# Patient Record
Sex: FEMALE | Race: WHITE | NOT HISPANIC OR LATINO
[De-identification: names, ages, dates, MRNs, and addresses within clinical notes are randomized per-mention and may not be internally consistent; named-entity substitution may affect disease eponyms.]

---

## 2018-02-08 ENCOUNTER — APPOINTMENT (OUTPATIENT)
Dept: NEUROLOGY | Facility: CLINIC | Age: 78
End: 2018-02-08

## 2018-12-04 ENCOUNTER — APPOINTMENT (OUTPATIENT)
Dept: CARDIOLOGY | Facility: CLINIC | Age: 78
End: 2018-12-04
Payer: MEDICARE

## 2018-12-04 ENCOUNTER — NON-APPOINTMENT (OUTPATIENT)
Age: 78
End: 2018-12-04

## 2018-12-04 VITALS
HEART RATE: 70 BPM | SYSTOLIC BLOOD PRESSURE: 130 MMHG | BODY MASS INDEX: 28.45 KG/M2 | DIASTOLIC BLOOD PRESSURE: 80 MMHG | WEIGHT: 177 LBS | HEIGHT: 66 IN | OXYGEN SATURATION: 96 %

## 2018-12-04 DIAGNOSIS — I10 ESSENTIAL (PRIMARY) HYPERTENSION: ICD-10-CM

## 2018-12-04 DIAGNOSIS — E78.5 HYPERLIPIDEMIA, UNSPECIFIED: ICD-10-CM

## 2018-12-04 DIAGNOSIS — I49.9 CARDIAC ARRHYTHMIA, UNSPECIFIED: ICD-10-CM

## 2018-12-04 DIAGNOSIS — Z87.39 PERSONAL HISTORY OF OTHER DISEASES OF THE MUSCULOSKELETAL SYSTEM AND CONNECTIVE TISSUE: ICD-10-CM

## 2018-12-04 DIAGNOSIS — R94.31 ABNORMAL ELECTROCARDIOGRAM [ECG] [EKG]: ICD-10-CM

## 2018-12-04 PROCEDURE — 93000 ELECTROCARDIOGRAM COMPLETE: CPT

## 2018-12-04 PROCEDURE — 93306 TTE W/DOPPLER COMPLETE: CPT

## 2018-12-04 PROCEDURE — 99204 OFFICE O/P NEW MOD 45 MIN: CPT

## 2018-12-04 RX ORDER — OMEGA-3/DHA/EPA/FISH OIL 910-1400MG
CAPSULE ORAL
Refills: 0 | Status: ACTIVE | COMMUNITY

## 2018-12-04 RX ORDER — EZETIMIBE 10 MG/1
10 TABLET ORAL
Qty: 90 | Refills: 3 | Status: ACTIVE | COMMUNITY
Start: 2018-12-04 | End: 1900-01-01

## 2018-12-04 RX ORDER — VALSARTAN AND HYDROCHLOROTHIAZIDE 160; 25 MG/1; MG/1
160-25 TABLET, FILM COATED ORAL DAILY
Qty: 90 | Refills: 3 | Status: ACTIVE | COMMUNITY

## 2018-12-04 RX ORDER — LEVOTHYROXINE SODIUM 0.03 MG/1
25 TABLET ORAL DAILY
Qty: 90 | Refills: 3 | Status: ACTIVE | COMMUNITY

## 2018-12-04 NOTE — HISTORY OF PRESENT ILLNESS
[FreeTextEntry1] : 77 yo female referred for evaluation of abnormal ecg, reportedly showing AFib.\par Known history of hypertension and hyperlipidemia, she denies any h/o CAD, DM. Denies any chest p[ain, SOB, palpitations otherwise, although does not exercise much.\par Intolerant of all statins, as per patient.\par No sig cardiac family history, non-drinker/smoker.

## 2018-12-04 NOTE — DISCUSSION/SUMMARY
[Non-specific ECG Changes] : abnormal ECG [Stress Test Treadmill] : an exercise treadmill test [Echocardiogram] : an echocardiogram [Isotope Perfusion Sestamibi] : cardiac perfusion imaging with sestamibi [Holter Monitor] : a Holter monitor [None] : There are no changes in medication management [Hyperlipidemia] : hyperlipidemia [Stable] : stable [Lipids Test Panel] : a fasting lipid profile [FreeTextEntry1] : No overt evidence of AFib despite the incorrect ecg reading. Will get Holter to confirm. Patient does however have multiple cardiac risk factors and an abnormal ECG - will get echocardiogram and exercise nuclear stress test to risk stratify further,

## 2018-12-04 NOTE — PHYSICAL EXAM
[General Appearance - Well Developed] : well developed [Normal Appearance] : normal appearance [Well Groomed] : well groomed [General Appearance - Well Nourished] : well nourished [No Deformities] : no deformities [General Appearance - In No Acute Distress] : no acute distress [Normal Conjunctiva] : the conjunctiva exhibited no abnormalities [Eyelids - No Xanthelasma] : the eyelids demonstrated no xanthelasmas [Normal Oral Mucosa] : normal oral mucosa [No Oral Pallor] : no oral pallor [No Oral Cyanosis] : no oral cyanosis [Normal Jugular Venous A Waves Present] : normal jugular venous A waves present [Normal Jugular Venous V Waves Present] : normal jugular venous V waves present [No Jugular Venous Chaudhari A Waves] : no jugular venous chaudhari A waves [Heart Rate And Rhythm] : heart rate and rhythm were normal [Heart Sounds] : normal S1 and S2 [Murmurs] : no murmurs present [Respiration, Rhythm And Depth] : normal respiratory rhythm and effort [Exaggerated Use Of Accessory Muscles For Inspiration] : no accessory muscle use [Auscultation Breath Sounds / Voice Sounds] : lungs were clear to auscultation bilaterally [Abdomen Soft] : soft [Abdomen Tenderness] : non-tender [Abdomen Mass (___ Cm)] : no abdominal mass palpated [Abnormal Walk] : normal gait [Gait - Sufficient For Exercise Testing] : the gait was sufficient for exercise testing [Nail Clubbing] : no clubbing of the fingernails [Cyanosis, Localized] : no localized cyanosis [Petechial Hemorrhages (___cm)] : no petechial hemorrhages [Skin Color & Pigmentation] : normal skin color and pigmentation [] : no rash [No Venous Stasis] : no venous stasis [Skin Lesions] : no skin lesions [No Skin Ulcers] : no skin ulcer [No Xanthoma] : no  xanthoma was observed [Oriented To Time, Place, And Person] : oriented to person, place, and time [Affect] : the affect was normal [Mood] : the mood was normal [No Anxiety] : not feeling anxious

## 2018-12-05 ENCOUNTER — APPOINTMENT (OUTPATIENT)
Dept: CARDIOLOGY | Facility: CLINIC | Age: 78
End: 2018-12-05
Payer: MEDICARE

## 2018-12-05 PROCEDURE — 93225 XTRNL ECG REC<48 HRS REC: CPT

## 2018-12-05 PROCEDURE — 93227 XTRNL ECG REC<48 HR R&I: CPT

## 2018-12-10 PROBLEM — I49.9 CARDIAC ARRHYTHMIA, UNSPECIFIED CARDIAC ARRHYTHMIA TYPE: Status: ACTIVE | Noted: 2018-12-04

## 2018-12-10 PROBLEM — R94.31 ABNORMAL EKG: Status: ACTIVE | Noted: 2018-12-04

## 2018-12-10 PROBLEM — I10 BENIGN HYPERTENSION: Status: ACTIVE | Noted: 2018-12-04

## 2018-12-20 ENCOUNTER — APPOINTMENT (OUTPATIENT)
Dept: CARDIOLOGY | Facility: CLINIC | Age: 78
End: 2018-12-20
Payer: MEDICARE

## 2018-12-20 PROCEDURE — 93015 CV STRESS TEST SUPVJ I&R: CPT

## 2018-12-20 PROCEDURE — 78452 HT MUSCLE IMAGE SPECT MULT: CPT

## 2018-12-20 PROCEDURE — A9500: CPT

## 2021-08-31 ENCOUNTER — EMERGENCY (EMERGENCY)
Facility: HOSPITAL | Age: 81
LOS: 0 days | Discharge: HOME | End: 2021-08-31
Payer: MEDICARE

## 2021-08-31 VITALS
DIASTOLIC BLOOD PRESSURE: 92 MMHG | WEIGHT: 169.98 LBS | SYSTOLIC BLOOD PRESSURE: 158 MMHG | HEART RATE: 76 BPM | HEIGHT: 66 IN | TEMPERATURE: 98 F | OXYGEN SATURATION: 100 % | RESPIRATION RATE: 20 BRPM

## 2021-08-31 VITALS
DIASTOLIC BLOOD PRESSURE: 84 MMHG | RESPIRATION RATE: 16 BRPM | SYSTOLIC BLOOD PRESSURE: 144 MMHG | OXYGEN SATURATION: 98 % | HEART RATE: 80 BPM

## 2021-08-31 DIAGNOSIS — I10 ESSENTIAL (PRIMARY) HYPERTENSION: ICD-10-CM
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
## 2021-08-31 DIAGNOSIS — M25.532 PAIN IN LEFT WRIST: ICD-10-CM

## 2021-08-31 DIAGNOSIS — Z04.3 ENCOUNTER FOR EXAMINATION AND OBSERVATION FOLLOWING OTHER ACCIDENT: ICD-10-CM

## 2021-08-31 DIAGNOSIS — T14.90XA INJURY, UNSPECIFIED, INITIAL ENCOUNTER: ICD-10-CM

## 2021-08-31 DIAGNOSIS — M25.562 PAIN IN LEFT KNEE: ICD-10-CM

## 2021-08-31 DIAGNOSIS — E78.5 HYPERLIPIDEMIA, UNSPECIFIED: ICD-10-CM
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
## 2021-08-31 PROCEDURE — 73110 X-RAY EXAM OF WRIST: CPT | Mod: 26,LT

## 2021-08-31 PROCEDURE — 73562 X-RAY EXAM OF KNEE 3: CPT | Mod: 26,LT

## 2021-08-31 PROCEDURE — 99284 EMERGENCY DEPT VISIT MOD MDM: CPT

## 2021-08-31 PROCEDURE — 73090 X-RAY EXAM OF FOREARM: CPT | Mod: 26,LT,76

## 2021-08-31 NOTE — ED ADULT NURSE REASSESSMENT NOTE - NS ED NURSE REASSESS COMMENT FT1
Cane provided with sling. Discharge instructions provided and verbalizes understanding of medication regimen and follow up care. Educational material provided. Denies any pain at this time.

## 2021-08-31 NOTE — ED PROVIDER NOTE - CLINICAL SUMMARY MEDICAL DECISION MAKING FREE TEXT BOX
81y Female with PMHx HTN, HLD presents to the ER for fall. Mechanical fall at 8am this morning, landing on her left arm. Went to urgent care, diagnosed with left fracture and sent in for orthopedics. Reporting left knee buckling. Denies fever, chills, trauma to area, redness, inability to walk, numbness, tingling. Denies hitting head, LOC, chest pain, shortness of breath or dizziness. Will get xrays, reassess. Dispo pending results.

## 2021-08-31 NOTE — CONSULT NOTE ADULT - SUBJECTIVE AND OBJECTIVE BOX
81y R Hand Dominant. Female patient presents to [place] ED c/o severe L wrist pain s/p mechanical fall. Patient denies head hit or LOC. Localizing pain to distal radius. Denies radiation of pain. Pt denies numbness, tingling or burning. Patient denies any other injuries.    PMH:  HTN (hypertension)    HLD (hyperlipidemia)      PSH:    AH:  penicillin (Rash)    Meds: See med rec    T(C): 36.4 (08-31-21 @ 13:21)  HR: 76 (08-31-21 @ 13:21)  BP: 158/92 (08-31-21 @ 13:21)  RR: 20 (08-31-21 @ 13:21)  SpO2: 100% (08-31-21 @ 13:21)  Wt(kg): --    PE :  Gen: NAD, A/Ox3, Following commands    L UE:  Skin intact,  visible deformity of wrist, + soft tissue swelling, no ecchymosis  Decreased ROM of Wrist 2/2 pain  Normal Elbow/Shoulder ROM w/o pain  + TTP over distal radius  No Snuff box TTP  + Rad Pulse   SILT C5-T1  +AIN/PIN/Ulnar/Radial/Musc/Median  compartments soft and compressible    Secondary Survey:   No TTP over bony prominences, SILT, palpable pulses, full/painless A/PROM, compartments soft. No TTP over spinous processes or paraspinal muscles at C/T/L spine. No palpable step off. No other injuries or complaints. Able to SLR BL. Negative logroll/heelstrike BL. Ambulating w/o assistance/pain.    Imaging:  XRay L Wrist  3 views of L Wrist demonstrates L distal radius fracture, extra-articular w/ posterior displacement of carpus/hand in relation to forearm. No other fx/dislocations noted.     Procedure Note:  After verbal consent obtained, ~ 10cc of 1% Lidocaine injected into area around DR/Ulna as hematoma block. UE hung by fingers and reduction maneuver performed. A well padded sugartong splint was applied to Forearm/Wrist and mold held. TN XR obtained which show improved alignment of Fracture. Post reduction NV exam unchanged. Pt tolerated procedure well.    A/P:   81yFemale s/p Mech Fall w/ L Distal Radius Fracture s/p closed reduction and splinting.    -Pt advised to remove all jewelry from affected limb.  -Pain control  -Strict Ice/Elevation to help with swelling  -NWB L UE with splint and sling  -Keep splint clean/dry/intact;  -Encourage active finger motion to help with swelling  -Pt aware of possible need for surgical intervention of distal radius fracture. Will FU as outpatient    -Recommend Ca & Vit D supplementation  -Recommend DEXA scan/Osteoporosis workup outpatient and treatment as needed    -Pt made aware of signs and symptoms of compartment syndrome and acute carpal tunnel syndrome. Aware of need to return to ED if symptoms develop.  -Recommend follow up outpatient w/ Dr. Foss in 2-3 days. Call office to schedule appointment.  -All Patient's and Family Member's questions answered. Patient/family understand and agree w/ plan.  -Will discuss w/ attending and advise if plan changes.

## 2021-08-31 NOTE — ED PROVIDER NOTE - NS ED ROS FT
Constitutional: (-) Fever, (-) Chills  Skin: (-) Rashes, (-) Wounds  Eyes: (-) Visual changes, (-) Discharge, (-) Redness  Ears: (-) Hearing loss, (-)Tinnitus, (-) Ear pain  Nose: (-) Nasal congestion, (-) Runny nose  Mouth/Throat: (-) Sore throat  CV: (-) Chest pain, (-) Palpitations, (-) Syncope  Resp: (-) Cough, (-) Shortness of breath, (-) Dyspnea on Exertion  GI: (-) Abdominal pain, (-) Nausea, (-) Vomiting  : (-) Dysuria  MSK:  (+) Myalgias, (-) Weakness, (-) Back pain  Neuro: (-) Loss of consciousness, (-) Headache

## 2021-08-31 NOTE — ED PROVIDER NOTE - PATIENT PORTAL LINK FT
You can access the FollowMyHealth Patient Portal offered by [facility] by registering at the following website: [email or web address]. By joining [employer]’s FollowMyHealth portal, you will also be able to view your health information using other applications (apps) compatible with our system.

## 2021-08-31 NOTE — ED PROVIDER NOTE - PROGRESS NOTE DETAILS
TON Recinos: Discussed in length with patient and daughter about patient going home with left knee ace wrap and cane for additional support. Offered PT evaluation for possible rehab placement but patient declined. States that she has help and feels same to go home. Daughter will provide assistance as they live together.

## 2021-08-31 NOTE — ED PROVIDER NOTE - NSFOLLOWUPINSTRUCTIONS_ED_ALL_ED_FT
Today you were seen in the ER for wrist pain.     Your xray showed a displaced fracture requiring reduction.     Take Motrin 600 mg every 8 hours as needed for moderate pain -- take with food.     Take Tylenol 650mg (Two 325 mg pills) every 4-6 hours as needed for pain.    Rest, Ice, Elevate injured area    Wear splint as discussed. Use can to assist with walking at this time.     Follow-up with Orthopedics within 1 week, See referred doctor. Call today / next business day for close, prompt follow-up.    Return to Emergency room for any worsening or persistent pain, weakness, numbness, fever, color change to extremity, or any other concerning symptoms.    Advance activity as tolerated.     Continue all previously prescribed medications as directed unless otherwise instructed.     Follow up with your primary care physician in 48-72 hours- bring copies of your results. Today you were seen in the ER for wrist pain.     Your xray showed a displaced fracture requiring reduction.     Take Motrin 600 mg every 8 hours as needed for moderate pain -- take with food.     Take Tylenol 650mg (Two 325 mg pills) every 4-6 hours as needed for pain.    Rest, Ice, Elevate injured area    Wear splint as discussed. Use can to assist with walking at this time.     Follow-up with Orthopedics in 2-3 days, See referred doctor. Call today / next business day for close, prompt follow-up.    Return to Emergency room for any worsening or persistent pain, weakness, numbness, fever, color change to extremity, or any other concerning symptoms.    Advance activity as tolerated.     Continue all previously prescribed medications as directed unless otherwise instructed.     Follow up with your primary care physician in 48-72 hours- bring copies of your results.

## 2021-08-31 NOTE — ED ADULT TRIAGE NOTE - CHIEF COMPLAINT QUOTE
pt alert and oriented x4 walked c/o pain to the left wrist tripped and fall at home bedroom denies any  head trauma  went urgent care ( Commuted impaction fracture across the distal radial metaphysis ) sent her for eval hx HTN

## 2021-08-31 NOTE — ED PROVIDER NOTE - CARE PROVIDER_API CALL
Lennox Kirk)  Orthopaedic Surgery; Sports Medicine  [address] [facility], [place] [address]  Phone: [phone]  Fax: [phone]  Follow Up Time:

## 2021-08-31 NOTE — ED ADULT NURSE NOTE - OBJECTIVE STATEMENT
pt is a 81 year old female, AAX4 presents status post fall on the right side of the body, pt was seen at [facility] urgent center, pt wrist wrapped and stabilized, xrays were taken, pt denies cp, sob, coughing, back pain, loc, blurry vision. PMH HTN, HLD. Allergy to penicillin-rash

## 2021-08-31 NOTE — ED ADULT NURSE REASSESSMENT NOTE - NS ED NURSE REASSESS COMMENT FT1
pt denies any discomfort or pain at this time, pt does not want any pain meds, pt was seen at [facility] urgent center early this afternoon and was given pain medications at that time

## 2021-08-31 NOTE — ED PROVIDER NOTE - OBJECTIVE STATEMENT
81y Female with PMHx HTN, HLD presents to the ER for fall. patient reports mechanical fall at 8am this morning, landing on her left arm. Went to urgent care, diagnosed with left fracture and sent in for orthopedics. In addition, patient reports left knee pain that has been getting progressively worse for the last few days. Denies fever, chills, trauma to area, redness, inability to walk, numbness, tingling. Denies hitting head, LOC, chest pain, shortness of breath or dizziness.

## 2021-09-01 RX ORDER — ACETAMINOPHEN WITH CODEINE 300MG-30MG
1 TABLET ORAL
Qty: 9 | Refills: 0
Start: 2021-09-01 | End: 2021-09-03

## 2023-10-24 ENCOUNTER — EMERGENCY (EMERGENCY)
Facility: HOSPITAL | Age: 83
LOS: 0 days | Discharge: HOME | End: 2023-10-24
Attending: STUDENT IN AN ORGANIZED HEALTH CARE EDUCATION/TRAINING PROGRAM
Payer: MEDICARE

## 2023-10-24 VITALS
DIASTOLIC BLOOD PRESSURE: 91 MMHG | OXYGEN SATURATION: 98 % | TEMPERATURE: 98 F | WEIGHT: 149.91 LBS | RESPIRATION RATE: 18 BRPM | HEART RATE: 74 BPM | HEIGHT: 66 IN | SYSTOLIC BLOOD PRESSURE: 177 MMHG
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
## 2023-10-24 DIAGNOSIS — Z88.0 ALLERGY STATUS TO PENICILLIN: ICD-10-CM

## 2023-10-24 DIAGNOSIS — M25.562 PAIN IN LEFT KNEE: ICD-10-CM

## 2023-10-24 DIAGNOSIS — E78.5 HYPERLIPIDEMIA, UNSPECIFIED: ICD-10-CM

## 2023-10-24 DIAGNOSIS — E03.9 HYPOTHYROIDISM, UNSPECIFIED: ICD-10-CM
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
## 2023-10-24 DIAGNOSIS — I10 ESSENTIAL (PRIMARY) HYPERTENSION: ICD-10-CM

## 2023-10-24 PROCEDURE — 99285 EMERGENCY DEPT VISIT HI MDM: CPT

## 2023-10-24 PROCEDURE — 73564 X-RAY EXAM KNEE 4 OR MORE: CPT | Mod: 26,LT

## 2023-10-24 RX ORDER — IBUPROFEN 200 MG
600 TABLET ORAL ONCE
Refills: 0 | Status: COMPLETED | OUTPATIENT
Start: 2023-10-24 | End: 2023-10-24

## 2023-10-24 RX ADMIN — Medication 600 MILLIGRAM(S): at 12:13

## 2023-10-24 RX ADMIN — Medication 600 MILLIGRAM(S): at 13:20

## 2023-10-24 NOTE — ED PROVIDER NOTE - PROVIDER TOKENS
PROVIDER:[TOKEN:[[id number]],FOLLOWUP:[1-3 Days]],FREE:[LAST:[your pmd in 1-3 days],PHONE:[(   )    -],FAX:[(   )    -]],FREE:[LAST:[[facility]],PHONE:[(   )    -],FAX:[(   )    -],ADDRESS:[Address: [address] [place] [address]  Phone: [phone]]]

## 2023-10-24 NOTE — ED PROVIDER NOTE - NS ED ATTENDING STATEMENT MOD
I have seen and examined this patient and fully participated in the care of this patient as the teaching attending.  The service was shared with the MANDY.  I reviewed and verified the documentation and independently performed the documented:

## 2023-10-24 NOTE — ED ADULT TRIAGE NOTE - CHIEF COMPLAINT QUOTE
pt c/o left knee pain since last night. pt states she moved awkwardly and injured her knee last night. states she can't bend her knee and the pain is shooting to her left groin. history of htn. bp 177/91

## 2023-10-24 NOTE — ED ADULT NURSE NOTE - OBJECTIVE STATEMENT
patient came here for left knee pain started last night, noted mild swelling to left knee, patient states something she feels pain but it's much worse today, denies falls
Patient/Caregiver provided printed discharge information.

## 2023-10-24 NOTE — ED PROVIDER NOTE - ATTENDING CONTRIBUTION TO CARE
82 y/o F hx of HTN, HLD, hypothyroidism presents for L knee pain for past 1 day. endorsing pushing a box 1 day ago and felt a pulling sensation in her L knee. pain is worse w/ movement and improved at rest. patient denies falling or getting hit directly in the knee. denies numbness/tingling. denies fever/chills. denies back pain.   patient is able to ambulate w/ cane. xray to r/o fracture. decreased ROM on flexion/extension relative to R knee. no significant erythema/edema.   patient LLE placed in knee immobilizer, cane given. return precautions discussed. f/u with ortho discussed w/ patient and family member at bedside.   Conversation had with patient regarding results of testing. Patient agrees with plan for discharge at this time. Patient agrees to comply with follow up with PCP. Return to ED precautions and discharge instructions given to patient.  likely strain in nature. considered fracture/dislocation but lower suspicion given hx and clinical exam.     I performed a history and physical exam of the patient and discussed their management with the MANDY. I have reviewed the MANDY note and agree with the documented findings and plan of care, except as noted. This was a shared visit with an MANDY. I reviewed and verified the documentation and independently performed my own history/exam/medical decision making. My medical decision making and observations are found above. Please refer to any progress notes for updates on clinical course.

## 2023-10-24 NOTE — ED ADULT NURSE NOTE - NSFALLRISKINTERV_ED_ALL_ED
Assistance OOB with selected safe patient handling equipment if applicable/Assistance with ambulation/Communicate fall risk and risk factors to all staff, patient, and family/Monitor gait and stability/Provide visual cue: yellow wristband, yellow gown, etc/Reinforce activity limits and safety measures with patient and family/Call bell, personal items and telephone in reach/Instruct patient to call for assistance before getting out of bed/chair/stretcher/Non-slip footwear applied when patient is off stretcher/[place] to call system/Physically safe environment - no spills, clutter or unnecessary equipment/Purposeful Proactive Rounding/Room/bathroom lighting operational, light cord in reach

## 2023-10-24 NOTE — ED PROVIDER NOTE - CARE PROVIDER_API CALL
Mitch Madison  Orthopaedic Surgery  [address] [place] [address]  Phone: [phone]  Fax: [phone]  Follow Up Time: 1-3 Days    your pmd in 1-3 days,   Phone: (   )    -  Fax: (   )    -  Follow Up Time:     [facility],   Address: [address] [place] [address]  Phone: [phone]  Phone: (   )    -  Fax: (   )    -  Follow Up Time:

## 2023-10-24 NOTE — ED PROVIDER NOTE - OBJECTIVE STATEMENT
82 y/o F with PMH HTN, HLD, hypothyroidism presents with moderate constant throbbing non-radiating L knee pain s/p pushing a box yesterday with L left planted and then feeling something pull in the knee.  +worse with walking, better with rest.  no direct trauma, paresthesia, fall, loc, n/v, ac use.

## 2023-10-24 NOTE — ED PROVIDER NOTE - PATIENT PORTAL LINK FT
You can access the FollowMyHealth Patient Portal offered by [facility] by registering at the following website: [email or web address]. By joining [employer]’s FollowMyHealth portal, you will also be able to view your health information using other applications (apps) compatible with our system.

## 2023-10-24 NOTE — ED PROVIDER NOTE - CLINICAL SUMMARY MEDICAL DECISION MAKING FREE TEXT BOX
placed in kni and given cane.   ambulatory with steady gait. placed in kni and given cane.   ambulatory with steady gait.    Seay: see attending attestation for MDM.

## 2023-10-24 NOTE — ED PROVIDER NOTE - PHYSICAL EXAMINATION
PHYSICAL EXAM:    GENERAL: Alert, appears stated age, well appearing, non-toxic  SKIN: Warm and dry. MMM.   HEAD: NC, AT  EYE: Normal lids/conjunctiva  ENT: Normal hearing, patent oropharynx   NECK: +supple. No meningismus, or JVD  Pulm: Bilateral BS, normal resp effort, no wheezes, stridor, or retractions  CV: RRR, no M/R/G, 2+and = radial pulses  Abd: soft, non-tender, non-distended  Mskel: +L knee ttp with mild edema. 2+ dp/pt pulses. no other ttp. +decreased rom of L knee 2/2 pain.   Neuro: AAOx3, no sensory/motor deficits, 5/5 strength throughout.

## 2023-10-26 ENCOUNTER — APPOINTMENT (OUTPATIENT)
Dept: ORTHOPEDIC SURGERY | Facility: CLINIC | Age: 83
End: 2023-10-26
Payer: MEDICARE

## 2023-10-26 VITALS — HEIGHT: 66 IN | BODY MASS INDEX: 28.45 KG/M2 | WEIGHT: 177 LBS

## 2023-10-26 DIAGNOSIS — I10 ESSENTIAL (PRIMARY) HYPERTENSION: ICD-10-CM

## 2023-10-26 DIAGNOSIS — M17.12 UNILATERAL PRIMARY OSTEOARTHRITIS, LEFT KNEE: ICD-10-CM

## 2023-10-26 DIAGNOSIS — E78.00 PURE HYPERCHOLESTEROLEMIA, UNSPECIFIED: ICD-10-CM

## 2023-10-26 DIAGNOSIS — M70.52 OTHER BURSITIS OF KNEE, LEFT KNEE: ICD-10-CM

## 2023-10-26 PROCEDURE — J3490M: CUSTOM

## 2023-10-26 PROCEDURE — 20611 DRAIN/INJ JOINT/BURSA W/US: CPT | Mod: LT

## 2023-10-26 PROCEDURE — 99203 OFFICE O/P NEW LOW 30 MIN: CPT | Mod: 25

## 2023-10-26 RX ORDER — MELOXICAM 15 MG/1
15 TABLET ORAL
Qty: 30 | Refills: 1 | Status: ACTIVE | COMMUNITY
Start: 2023-10-26 | End: 1900-01-01

## 2024-07-19 NOTE — ED ADULT TRIAGE NOTE - HEIGHT IN CM
Subjective:      Patient ID: Padma Pham is a 65 y.o. female.    Chief Complaint: Pain of the Left Shoulder    Review of patient's allergies indicates:  No Known Allergies   64 yo RHD F presents to clinic with c/o left shoulder pain x 5 months.  No specific injury or trauma but she did notice pain was worse after pulling her garbage can down her driveway.  Achy/sharp pain mostly to anterior shoulder, radiates into upper arm at times.  Worse with reaching behind her back and improves some with rest.  Improves some with rest.  No numbness/tingling.  No neck pain or injury.  Tried diclofenac with little relief.          Review of Systems   Constitutional: Negative for chills, diaphoresis and fever.   HENT:  Negative for congestion, ear discharge and ear pain.    Eyes:  Negative for blurred vision, discharge, double vision and pain.   Cardiovascular:  Negative for chest pain, claudication and cyanosis.   Respiratory:  Negative for cough, hemoptysis and shortness of breath.    Endocrine: Negative for cold intolerance and heat intolerance.   Skin:  Negative for color change, dry skin, itching and rash.   Musculoskeletal:  Positive for joint pain. Negative for arthritis, back pain, falls, gout, joint swelling, muscle weakness and neck pain.   Gastrointestinal:  Negative for abdominal pain and change in bowel habit.   Neurological:  Negative for brief paralysis, disturbances in coordination, dizziness, numbness and paresthesias.   Psychiatric/Behavioral:  Negative for altered mental status and depression.          Objective:          General    Constitutional: She is oriented to person, place, and time. She appears well-developed and well-nourished. No distress.   HENT:   Head: Atraumatic.   Eyes: EOM are normal. Right eye exhibits no discharge. Left eye exhibits no discharge.   Cardiovascular:  Normal rate.            Pulmonary/Chest: Effort normal. No respiratory distress.   Abdominal: Soft.   Neurological: She is  alert and oriented to person, place, and time.   Psychiatric: She has a normal mood and affect. Her behavior is normal.         Right Shoulder Exam     Inspection/Observation   Swelling: absent  Bruising: absent  Scars: absent  Deformity: absent    Range of Motion   Active abduction:  normal   Passive abduction:  normal   Forward Flexion:  normal   Forward Elevation: normal  External Rotation 0 degrees:  normal   Internal rotation 0 degrees:  normal     Tests & Signs   Ramirez test: negative  Impingement: negative  Lift Off Sign: negative  Belly Press: negative    Other   Sensation: normal    Left Shoulder Exam     Inspection/Observation   Swelling: absent  Bruising: absent  Scars: absent  Deformity: absent    Range of Motion   Active abduction:  normal   Passive abduction:  normal   Forward Flexion:  normal   Forward Elevation: normal  External Rotation 0 degrees:  normal   Internal rotation 0 degrees:  abnormal     Tests & Signs   Ramirez test: negative  Impingement: negative  Lift Off Sign: negative  Belly Press: negative    Other   Sensation: normal     Comments:  Tenderness to anterior shoulder      Muscle Strength   Right Upper Extremity   Shoulder Abduction: 5/5   Shoulder Internal Rotation: 5/5   Shoulder External Rotation: 5/5   Subscapularis: 5/5   Left Upper Extremity  Shoulder Abduction: 5/5   Shoulder Internal Rotation: 4/5   Shoulder External Rotation: 5/5   Subscapularis: 4/5     Vascular Exam     Right Pulses      Radial:                    2+      Left Pulses      Radial:                    2+      Capillary Refill  Right Hand: normal capillary refill  Left Hand: normal capillary refill                  Assessment:         Xray Left Shoulder 5/30/24:  No evidence of fracture.Mild degenerative change of the acromioclavicular joint.       Encounter Diagnoses   Name Primary?    Chronic left shoulder pain Yes    Acute pain of left shoulder     Chronic left shoulder pain  -     Ambulatory  referral/consult to Orthopedics  -     triamcinolone acetonide injection 40 mg    Acute pain of left shoulder               Plan:         The total face-to-face encounter time with this patient was 30 minutes and greater than 50% of of the encounter time was spent counseling the patient, coordinating care, and education regarding the diagnosis. We have discussed a variety of treatment options including medications, injections, physical therapy and other alternative treatments. I also explained the indications, risks and benefits of surgery. Patient chooses to proceed with CSI and home exercise program at this time.  Today, the patient chooses  a CSI and understands a minimum of 3 months time must lapse after injection, prior to a surgical procedure due to increased risk of infection.     I made the decision to obtain old records of the patient including previous notes and imaging.       1. Injection Procedure  A time out was performed, including verification of patient ID, procedure, site and side, availability of information and equipment, review of safety issues, and agreement with consent, the procedure site was marked.    After time out was performed, the patient was prepped aseptically with chloraprep swabstick. A diagnostic and therapeutic injection of 1:3cc Kenalog/Marcaine was given under sterile technique using a 22g x 1.5 needle from the Posterior  aspect of the left Subacromial in the sitting position.      Padma Pham had no adverse reactions to the medication. Pain decreased. She was instructed to apply ice to the joint for 20 minutes and avoid strenuous activities for 24-36 hours following the injection. She was warned of possible blood sugar and/or blood pressure changes during that time. Following that time, she can resume regular activities.    She was reminded to call the clinic immediately for any adverse side effects as explained in clinic today.    2. Ice compress to the affected area 2-3x a  day for 15-20 minutes as needed for pain management.  3. Shoulder conditioning home exercise program. AAOS handout of exercises provided to patient.  4. RTC in 6 weeks for follow-up, sooner if needed.    Patient voices understanding of and agreement with treatment plan. All of the patient's questions were answered and the patient will contact us if she has any questions or concerns in the interim.               167.64